# Patient Record
Sex: FEMALE | Race: BLACK OR AFRICAN AMERICAN | Employment: FULL TIME | URBAN - METROPOLITAN AREA
[De-identification: names, ages, dates, MRNs, and addresses within clinical notes are randomized per-mention and may not be internally consistent; named-entity substitution may affect disease eponyms.]

---

## 2019-01-24 ENCOUNTER — HOSPITAL ENCOUNTER (OUTPATIENT)
Dept: ULTRASOUND IMAGING | Age: 39
Discharge: HOME | End: 2019-01-24
Attending: OBSTETRICS & GYNECOLOGY
Payer: COMMERCIAL

## 2019-01-24 ENCOUNTER — TELEPHONE (OUTPATIENT)
Dept: OBGYN CLINIC | Age: 39
End: 2019-01-24

## 2019-01-24 ENCOUNTER — OFFICE VISIT (OUTPATIENT)
Dept: OBGYN CLINIC | Age: 39
End: 2019-01-24

## 2019-01-24 ENCOUNTER — HOSPITAL ENCOUNTER (OUTPATIENT)
Dept: LAB | Age: 39
Discharge: HOME | End: 2019-01-24
Payer: COMMERCIAL

## 2019-01-24 VITALS
TEMPERATURE: 98.5 F | SYSTOLIC BLOOD PRESSURE: 139 MMHG | WEIGHT: 201.8 LBS | DIASTOLIC BLOOD PRESSURE: 79 MMHG | BODY MASS INDEX: 29.89 KG/M2 | HEIGHT: 69 IN | OXYGEN SATURATION: 100 % | HEART RATE: 77 BPM

## 2019-01-24 DIAGNOSIS — O09.521 AMA (ADVANCED MATERNAL AGE) MULTIGRAVIDA 35+, FIRST TRIMESTER: ICD-10-CM

## 2019-01-24 DIAGNOSIS — O26.851 SPOTTING AFFECTING PREGNANCY IN FIRST TRIMESTER: ICD-10-CM

## 2019-01-24 DIAGNOSIS — N92.6 MISSED MENSES: Primary | ICD-10-CM

## 2019-01-24 LAB
ABO + RH BLD: NORMAL
BASOPHILS # BLD: 0 K/UL (ref 0–0.1)
BASOPHILS NFR BLD: 0 % (ref 0–2)
BLOOD GROUP ANTIBODIES SERPL: NORMAL
DIFFERENTIAL METHOD BLD: NORMAL
EOSINOPHIL # BLD: 0.1 K/UL (ref 0–0.4)
EOSINOPHIL NFR BLD: 1 % (ref 0–5)
ERYTHROCYTE [DISTWIDTH] IN BLOOD BY AUTOMATED COUNT: 13.4 % (ref 11.6–14.5)
HCG SERPL-ACNC: 4351 MIU/ML (ref 0–10)
HCG URINE, QL. (POC): POSITIVE
HCT VFR BLD AUTO: 40.2 % (ref 35–45)
HGB BLD-MCNC: 13.3 G/DL (ref 12–16)
LYMPHOCYTES # BLD: 1.7 K/UL (ref 0.9–3.6)
LYMPHOCYTES NFR BLD: 29 % (ref 21–52)
MCH RBC QN AUTO: 30.7 PG (ref 24–34)
MCHC RBC AUTO-ENTMCNC: 33.1 G/DL (ref 31–37)
MCV RBC AUTO: 92.8 FL (ref 74–97)
MONOCYTES # BLD: 0.3 K/UL (ref 0.05–1.2)
MONOCYTES NFR BLD: 5 % (ref 3–10)
NEUTS SEG # BLD: 3.9 K/UL (ref 1.8–8)
NEUTS SEG NFR BLD: 65 % (ref 40–73)
PLATELET # BLD AUTO: 237 K/UL (ref 135–420)
PMV BLD AUTO: 10.7 FL (ref 9.2–11.8)
RBC # BLD AUTO: 4.33 M/UL (ref 4.2–5.3)
SPECIMEN EXP DATE BLD: NORMAL
VALID INTERNAL CONTROL?: YES
WBC # BLD AUTO: 5.9 K/UL (ref 4.6–13.2)

## 2019-01-24 PROCEDURE — 85025 COMPLETE CBC W/AUTO DIFF WBC: CPT

## 2019-01-24 PROCEDURE — 76817 TRANSVAGINAL US OBSTETRIC: CPT

## 2019-01-24 PROCEDURE — 86900 BLOOD TYPING SEROLOGIC ABO: CPT

## 2019-01-24 PROCEDURE — 84702 CHORIONIC GONADOTROPIN TEST: CPT

## 2019-01-24 NOTE — PROGRESS NOTES
Name: Alvarez Bedolla MRN: [id number] [address] YOB: 1980  Age: 45 y.o. Sex: female Chief Complaint Patient presents with  Missed Menses  
  positive home preg test  
 
 
HPI Spotting began yesterday. LMP=18  
9w6d by LMP. Pt reports regular cycles, sure LMP Mostly spotting with wiping only. She has a 12 yo daughter. OB History  Para Term  AB Living 1 SAB TAB Ectopic Molar Multiple Live Births G1 TNSVD-2003 G2 current PGyn Social History Substance and Sexual Activity Sexual Activity Not Currently  Partners: Male  Birth control/protection: None History reviewed. No pertinent past medical history. Past Surgical History:  
Procedure Laterality Date  HX ORTHOPAEDIC    
 left bone spur removal from knee Allergies not on file No current outpatient medications on file prior to visit. No current facility-administered medications on file prior to visit. Review of Systems All other systems reviewed and are negative. Visit Vitals /79 Pulse 77 Temp 98.5 °F (36.9 °C) (Oral) Ht 5' 9\" (1.753 m) Wt 201 lb 12.8 oz (91.5 kg) LMP 2018 (Exact Date) SpO2 100% BMI 29.80 kg/m² GENERAL:  Well developed, well nourished, in no distress PELVIC EXAM: 
LABIA MAJORA: no masses, tenderness or lesions LABIA MINORA: no masses, tenderness or lesions CLITORIS: no masses, tenderness or lesions URETHRA: normal appearing, no masses or tenderness BLADDER: no fullness or tenderness VAGINA: pink appearing vagina with moderate amount of dark brown heme PERINEUM: no masses, tenderness or lesions CERVIX: No CMT or lesions; feels soft, slightly effaced UTERUS: small, mobile, nontender ADNEXA: nontender and no masses Recent Results (from the past 24 hour(s)) AMB POC URINE PREGNANCY TEST, VISUAL COLOR COMPARISON  Collection Time: 19 10:10 AM  
 Result Value Ref Range VALID INTERNAL CONTROL POC Yes HCG urine, Ql. (POC) Positive Negative 1. Missed menses - AMB POC URINE PREGNANCY TEST, VISUAL COLOR COMPARISON 2. Spotting affecting pregnancy in first trimester - US PELV NON OB W TV-stat; Future. US ordered. Radiology called. - TYPE & SCREEN; Future - BETA HCG, QT; Future CBC ordered Dx of probable SAB discussed. Pain and bleeding instructions reviewed. 3. AMA (advanced maternal age) multigravida 33+, first trimester Plan to RTO 4 days to review US, labs, and discuss POC.

## 2019-01-24 NOTE — TELEPHONE ENCOUNTER
Patient calling for lab and imaging results from this morning. Advised patient that labs are sent out and are not resulted on the same day. Advised patient that the imaging would be available after it is reviewed by the provider. Patient denies further questions.

## 2019-01-24 NOTE — PATIENT INSTRUCTIONS
Miscarriage: Care Instructions Your Care Instructions The loss of a pregnancy can be very hard. You may wonder why it happened or blame yourself. Miscarriages are common and are not caused by exercise, stress, or sex. Most happen because the fertilized egg in the uterus does not develop normally. There is no treatment that can stop a miscarriage. As long as you do not have heavy blood loss, fever, weakness, or other signs of infection, you can let a miscarriage follow its own course. This can take several days. Your body will recover over the next several weeks. Having a miscarriage does not mean you cannot have a normal pregnancy in the future. The doctor has checked you carefully, but problems can develop later. If you notice any problems or new symptoms, get medical treatment right away. Follow-up care is a key part of your treatment and safety. Be sure to make and go to all appointments, and call your doctor if you are having problems. It's also a good idea to know your test results and keep a list of the medicines you take. How can you care for yourself at home? · You will probably have some vaginal bleeding for 1 to 2 weeks. It may be similar to or slightly heavier than a normal period. The bleeding should get lighter after a week. Use pads instead of tampons. You may use tampons during your next period, which should start in 3 to 6 weeks. · Take an over-the-counter pain medicine, such as acetaminophen (Tylenol), ibuprofen (Advil, Motrin), or naproxen (Aleve) for cramps. Read and follow all instructions on the label. You may have cramps for several days after the miscarriage. · Do not take two or more pain medicines at the same time unless the doctor told you to. Many pain medicines have acetaminophen, which is Tylenol. Too much acetaminophen (Tylenol) can be harmful. · Use a clear container to save any tissue that you pass. Take it to your doctor's office as soon as you can. · Do not have sex until the bleeding stops. · You may return to your normal activities if you feel well enough to do so. But you should avoid heavy exercise until the bleeding stops. · If you plan to get pregnant again, check with your doctor. Most doctors suggest waiting until you have had at least one normal period before you try to get pregnant. · If you do not want to get pregnant, ask your doctor about birth control. You can get pregnant again before your next period starts if you are not using birth control. · You may be low in iron because of blood loss. Eat a balanced diet that is high in iron and vitamin C. Foods rich in iron include red meat, shellfish, eggs, beans, and leafy green vegetables. Foods high in vitamin C include citrus fruits, tomatoes, and broccoli. Talk to your doctor about whether you need to take iron pills or a multivitamin. · The loss of a pregnancy can be very hard. You may wonder why it happened and blame yourself. Talking to family members, friends, a counselor, or your doctor may help you cope with your loss. When should you call for help? Call 911 anytime you think you may need emergency care. For example, call if: 
  · You passed out (lost consciousness).  
 Call your doctor now or seek immediate medical care if: 
  · You have severe vaginal bleeding.  
  · You are dizzy or lightheaded, or you feel like you may faint.  
  · You have new or worse pain in your belly or pelvis.  
  · You have a fever.  
  · You have vaginal discharge that smells bad.  
 Watch closely for changes in your health, and be sure to contact your doctor if: 
  · You do not get better as expected. Where can you learn more? Go to http://alesia-drake.info/. Enter E802 in the search box to learn more about \"Miscarriage: Care Instructions. \" Current as of: September 5, 2018 Content Version: 11.9 © 2080-8835 [employer].  Care instructions adapted under license by [address] (which disclaims liability or warranty for this information). If you have questions about a medical condition or this instruction, always ask your healthcare professional. [address] any warranty or liability for your use of this information.

## 2019-01-25 ENCOUNTER — TELEPHONE (OUTPATIENT)
Dept: OBGYN CLINIC | Age: 39
End: 2019-01-25

## 2019-01-25 NOTE — TELEPHONE ENCOUNTER
I called pt and advised her of the US and HCG results. She advised me she thinks she passed the pregnancy due to the large blood clots she had passed. I advised her to keep the plan which was to have her scheduled for an appt with me next Monday for an [address] in the office and labs. She states she does not have an appt scheduled. I advised her to call the office to schedule an appt with me Monday-ok to overbook schedule.

## 2019-01-25 NOTE — TELEPHONE ENCOUNTER
Patient called and stated that she passed several big clots last night and is continuing to bleed though not as heavy and states that it is bright red blood. Patient had her ultrasound and is wanting to know what she needs to do. Patient has many questions regarding next steps to take. Patient was informed Dr. Regis Bateman is on call and will be notified of her call.     [phone]

## 2019-01-25 NOTE — PROGRESS NOTES
Ultrasound reviewed. No signs of an ectopic pregnancy on ultrasound. Small sac inside the uterus that measures 5 weeks and 2 days. No heart beat. Plan to follow-up in office on Monday as scheduled.

## 2019-01-28 ENCOUNTER — OFFICE VISIT (OUTPATIENT)
Dept: OBGYN CLINIC | Age: 39
End: 2019-01-28

## 2019-01-28 ENCOUNTER — HOSPITAL ENCOUNTER (OUTPATIENT)
Dept: LAB | Age: 39
Discharge: HOME | End: 2019-01-28
Payer: COMMERCIAL

## 2019-01-28 VITALS
HEART RATE: 73 BPM | DIASTOLIC BLOOD PRESSURE: 68 MMHG | TEMPERATURE: 98.5 F | SYSTOLIC BLOOD PRESSURE: 117 MMHG | BODY MASS INDEX: 29.77 KG/M2 | HEIGHT: 69 IN | WEIGHT: 201 LBS | OXYGEN SATURATION: 99 % | RESPIRATION RATE: 18 BRPM

## 2019-01-28 DIAGNOSIS — O20.0 THREATENED MISCARRIAGE: Primary | ICD-10-CM

## 2019-01-28 DIAGNOSIS — O20.0 THREATENED MISCARRIAGE: ICD-10-CM

## 2019-01-28 LAB — HCG SERPL-ACNC: 544 MIU/ML (ref 0–10)

## 2019-01-28 PROCEDURE — 84702 CHORIONIC GONADOTROPIN TEST: CPT

## 2019-01-28 NOTE — PATIENT INSTRUCTIONS
Miscarriage: Care Instructions Your Care Instructions The loss of a pregnancy can be very hard. You may wonder why it happened or blame yourself. Miscarriages are common and are not caused by exercise, stress, or sex. Most happen because the fertilized egg in the uterus does not develop normally. There is no treatment that can stop a miscarriage. As long as you do not have heavy blood loss, fever, weakness, or other signs of infection, you can let a miscarriage follow its own course. This can take several days. Your body will recover over the next several weeks. Having a miscarriage does not mean you cannot have a normal pregnancy in the future. The doctor has checked you carefully, but problems can develop later. If you notice any problems or new symptoms, get medical treatment right away. Follow-up care is a key part of your treatment and safety. Be sure to make and go to all appointments, and call your doctor if you are having problems. It's also a good idea to know your test results and keep a list of the medicines you take. How can you care for yourself at home? · You will probably have some vaginal bleeding for 1 to 2 weeks. It may be similar to or slightly heavier than a normal period. The bleeding should get lighter after a week. Use pads instead of tampons. You may use tampons during your next period, which should start in 3 to 6 weeks. · Take an over-the-counter pain medicine, such as acetaminophen (Tylenol), ibuprofen (Advil, Motrin), or naproxen (Aleve) for cramps. Read and follow all instructions on the label. You may have cramps for several days after the miscarriage. · Do not take two or more pain medicines at the same time unless the doctor told you to. Many pain medicines have acetaminophen, which is Tylenol. Too much acetaminophen (Tylenol) can be harmful. · Use a clear container to save any tissue that you pass. Take it to your doctor's office as soon as you can. · Do not have sex until the bleeding stops. · You may return to your normal activities if you feel well enough to do so. But you should avoid heavy exercise until the bleeding stops. · If you plan to get pregnant again, check with your doctor. Most doctors suggest waiting until you have had at least one normal period before you try to get pregnant. · If you do not want to get pregnant, ask your doctor about birth control. You can get pregnant again before your next period starts if you are not using birth control. · You may be low in iron because of blood loss. Eat a balanced diet that is high in iron and vitamin C. Foods rich in iron include red meat, shellfish, eggs, beans, and leafy green vegetables. Foods high in vitamin C include citrus fruits, tomatoes, and broccoli. Talk to your doctor about whether you need to take iron pills or a multivitamin. · The loss of a pregnancy can be very hard. You may wonder why it happened and blame yourself. Talking to family members, friends, a counselor, or your doctor may help you cope with your loss. When should you call for help? Call 911 anytime you think you may need emergency care. For example, call if: 
  · You passed out (lost consciousness).  
 Call your doctor now or seek immediate medical care if: 
  · You have severe vaginal bleeding.  
  · You are dizzy or lightheaded, or you feel like you may faint.  
  · You have new or worse pain in your belly or pelvis.  
  · You have a fever.  
  · You have vaginal discharge that smells bad.  
 Watch closely for changes in your health, and be sure to contact your doctor if: 
  · You do not get better as expected. Where can you learn more? Go to http://alesia-drake.info/. Enter E802 in the search box to learn more about \"Miscarriage: Care Instructions. \" Current as of: September 5, 2018 Content Version: 11.9 © 2039-1777 [employer].  Care instructions adapted under license by [address] (which disclaims liability or warranty for this information). If you have questions about a medical condition or this instruction, always ask your healthcare professional. [address] any warranty or liability for your use of this information.

## 2019-01-28 NOTE — PROGRESS NOTES
Name: Francheska Drew MRN: [id number] [address] YOB: 1980  Age: 45 y.o. Sex: female Chief Complaint Patient presents with  Threatened Miscarriage HPI Pt presents for follow-up of her US on  and HCG. She passed a large clot on Friday followed by occasional smalled clots over weekend. Bleeding minimal now. Denies pain. OB History  Para Term  AB Living 1 SAB TAB Ectopic Molar Multiple Live Births PGyn Social History Substance and Sexual Activity Sexual Activity Not Currently  Partners: Male  Birth control/protection: None History reviewed. No pertinent past medical history. Past Surgical History:  
Procedure Laterality Date  HX ORTHOPAEDIC    
 left bone spur removal from knee No Known Allergies No current outpatient medications on file prior to visit. No current facility-administered medications on file prior to visit. Social History Socioeconomic History  Marital status:  Spouse name: Not on file  Number of children: Not on file  Years of education: Not on file  Highest education level: Not on file Social Needs  Financial resource strain: Not on file  Food insecurity - worry: Not on file  Food insecurity - inability: Not on file  Transportation needs - medical: Not on file  Transportation needs - non-medical: Not on file Occupational History  Not on file Tobacco Use  Smoking status: Never Smoker  Smokeless tobacco: Never Used Substance and Sexual Activity  Alcohol use: No  
  Frequency: Never  Drug use: No  
 Sexual activity: Not Currently Partners: Male Birth control/protection: None Other Topics Concern  Not on file Social History Narrative  Not on file Family History Problem Relation Age of Onset  Diabetes Mother  Hypertension Father Review of Systems All other systems reviewed and are negative. Visit Vitals /68 (BP 1 Location: Left arm, BP Patient Position: Sitting) Pulse 73 Temp 98.5 °F (36.9 °C) (Oral) Resp 18 Ht 5' 9\" (1.753 m) Wt 201 lb (91.2 kg) LMP 11/19/2018 (Exact Date) SpO2 99% BMI 29.68 kg/m² GENERAL:  Well developed, well nourished, in no distress NEURO/PSYCHE: Grossly intact, normal mood and affect HEENT: Normal cephalic, atraumatic, good dentition, neck supple. No thyromegaly CV: regular rate and rhythm LUNGS: clear to auscultation bilaterally, no wheezes, rhonchi or rales, good air entry with normal effort ABDOMEN: + BS, soft without tenderness, no guarding, rebound or masses EXTREMITIES: no edema or erythema noted SKIN:  Warm, dry, no lesions LYMPHATICS: No supraclavicular or inguinal nodes noted PELVIC EXAM: 
LABIA MAJORA: no masses, tenderness or lesions LABIA MINORA: no masses, tenderness or lesions CLITORIS: no masses, tenderness or lesions URETHRA: normal appearing, no masses or tenderness BLADDER: no fullness or tenderness VAGINA: pink appearing vagina with physiologic discharge, no lesions PERINEUM: no masses, tenderness or lesions CERVIX: No CMT or lesions UTERUS: small, mobile, nontender ADNEXA: nontender and no masses Study Result EXAM:  Ultrasound Obstetrical Transvaginal. 
  
CLINICAL INDICATION:  Spotting since last Thursday which became heavy today. 
  
COMPARISON:  None. 
  
TECHNIQUE:  Transvaginal scan with 8.0 MHz endovaginal probe. 
  
FINDINGS: 
  
Gestation: 
  
- A small saccular structure is noted in the endometrial cavity. 
- Mean sac diameter of 1.2 cm (corresponding to 5 weeks 2 days). - A tiny saccular structure suggested within the suspected gestational sac, 
measuring about 0.2 cm in diameter. This may represent a small yolk sac. - No fetal pole is observed. Therefore fetal cardiac activity cannot be 
assessed. - Too early for placenta evaluation. - No convincing evidence of subchorionic hemorrhage is detected. 
  
Uterus:   
  
- The uterus measures 13.8 x 6.7 x 6.3 cm.   
- The uterus contains a hypoechoic structure at the fundus region shadowing 
which probably represents a fibroid measuring 4.9 x 4.5 x 5.0 cm. 
  
Right ovary:  Not visualized. 
  
Left ovary:  Not visualized. 
  
Cul-de-sac:  Trace free fluid collections in the cul-de-sac. 
  
IMPRESSION IMPRESSION: 
  
1. Small saccular structure in the endometrium. Favor intrauterine pregnancy. Low confidence finding due to technical difficulties. Please correlate with 
serial serum beta-HCG. 2.  No adnexal mass. Nonvisualization of the ovaries. 3.  Uterine fundus region with a fairly prominent fibroid. HCG 1/2486=[id number] Transvaginal ultrasound shows thickened endometrium=7.93 mm, 9.56 mm; normal right adnexae visualized with ovarian follicles present, left adnexae not visualized due to overlying bowel No results found for this or any previous visit (from the past 24 hour(s)). 1. Threatened miscarriage - BETA HCG, QT; Future - COLLECTION VENOUS BLOOD,VENIPUNCTURE 
- prenatal vit-calcium-iron-fa (PRENATAL PLUS WITH CALCIUM) 27 mg iron- 1 mg tab; Take 1 Tab by mouth daily. Dispense: 90 Tab; Refill: 3 
- AMB POC US OB TRANSVAGINAL Evaluate for incomplete SAB with retained POC vs Complete SAB. Option of cytotec vs D and C if retained POC discussed. Pt plans to RTW Wed. Of this week F/U 1 week

## 2019-01-29 NOTE — PROGRESS NOTES
Please advise pt that her HCG was 544, down from 4351 so it is likely she has passed all products of conception and will not need a D and C.

## 2019-01-30 NOTE — PROGRESS NOTES
Call made to patient using two identifiers, name and . Informed patient of decreasing beta HCG levels. Patient verbalized understanding and no further questions were asked.

## 2019-02-04 ENCOUNTER — OFFICE VISIT (OUTPATIENT)
Dept: OBGYN CLINIC | Age: 39
End: 2019-02-04

## 2019-02-04 ENCOUNTER — HOSPITAL ENCOUNTER (OUTPATIENT)
Dept: LAB | Age: 39
Discharge: HOME | End: 2019-02-04
Payer: COMMERCIAL

## 2019-02-04 VITALS
RESPIRATION RATE: 16 BRPM | WEIGHT: 203 LBS | SYSTOLIC BLOOD PRESSURE: 112 MMHG | TEMPERATURE: 97.8 F | HEART RATE: 69 BPM | OXYGEN SATURATION: 100 % | DIASTOLIC BLOOD PRESSURE: 63 MMHG | HEIGHT: 69 IN | BODY MASS INDEX: 30.07 KG/M2

## 2019-02-04 DIAGNOSIS — O03.9 MISCARRIAGE: ICD-10-CM

## 2019-02-04 DIAGNOSIS — O03.9 MISCARRIAGE: Primary | ICD-10-CM

## 2019-02-04 LAB — HCG SERPL-ACNC: 85 MIU/ML (ref 0–10)

## 2019-02-04 PROCEDURE — 84702 CHORIONIC GONADOTROPIN TEST: CPT

## 2019-02-06 ENCOUNTER — TELEPHONE (OUTPATIENT)
Dept: OBGYN CLINIC | Age: 39
End: 2019-02-06

## 2019-02-06 NOTE — TELEPHONE ENCOUNTER
Received return call from patient. Conveyed information in prior notes. Patient verbalized understanding.

## 2019-02-06 NOTE — TELEPHONE ENCOUNTER
Call made to the pt anitha for her to call the office. Beta HCG continues to decrease and likely was a SAB. Pt will need to take a home pregnancy test and if it is negative then there is no need to follow up.

## 2019-02-06 NOTE — TELEPHONE ENCOUNTER
----- Message from Nikhil Ibarra MD sent at 2/5/2019 10:16 PM EST -----  Please let patient know that her bhcg continues to decrease. Likely SAB. She should take a upreg in two weeks. If negative no need for follow up.

## 2019-02-06 NOTE — PROGRESS NOTES
Please let patient know that her bhcg continues to decrease. Likely SAB. She should take a upreg in two weeks. If negative no need for follow up.

## 2019-02-12 ENCOUNTER — TELEPHONE (OUTPATIENT)
Dept: OBGYN CLINIC | Age: 39
End: 2019-02-12

## 2019-02-20 NOTE — TELEPHONE ENCOUNTER
Attempting to return call to patient who left VM, called patient's cell phone and her  answered he stated that she left her phone at home.   He will tell her to call the office back

## 2019-02-22 ENCOUNTER — TELEPHONE (OUTPATIENT)
Dept: OBGYN CLINIC | Age: 39
End: 2019-02-22

## 2019-02-22 NOTE — TELEPHONE ENCOUNTER
Received a call from the pt using two identifiers,name and . Pt stated that she had a miscarriage earlier this month and for the past two days she has had heavy bleeding with clots, not soaking the pad every hour maybe every 2 hours but it has slowed up today. PT made aware that we have been trying to contact her to let her know that the provider said that her Beta hcg was continuing to decrease. Likely SAB and she should take a pregnancy test in two weeks , if negative no need for follow up. Pt verbalized understanding and has not taken the pregnancy test but will and advised that if she starts to soak 2-3 pads in an hour, feeling faint, dizzy or light headed to go to the ER to be evaluated but if the test is positive to call our office to schedule an appointment. Pt verbalized understanding.